# Patient Record
Sex: MALE | Race: WHITE | NOT HISPANIC OR LATINO | URBAN - METROPOLITAN AREA
[De-identification: names, ages, dates, MRNs, and addresses within clinical notes are randomized per-mention and may not be internally consistent; named-entity substitution may affect disease eponyms.]

---

## 2024-01-02 ENCOUNTER — OFFICE VISIT (OUTPATIENT)
Dept: PEDIATRICS | Facility: CLINIC | Age: 19
End: 2024-01-02
Payer: COMMERCIAL

## 2024-01-02 VITALS
SYSTOLIC BLOOD PRESSURE: 116 MMHG | DIASTOLIC BLOOD PRESSURE: 82 MMHG | WEIGHT: 315 LBS | OXYGEN SATURATION: 98 % | HEIGHT: 72 IN | BODY MASS INDEX: 42.66 KG/M2 | TEMPERATURE: 97 F

## 2024-01-02 DIAGNOSIS — J40 BRONCHITIS: ICD-10-CM

## 2024-01-02 DIAGNOSIS — J01.90 ACUTE NON-RECURRENT SINUSITIS, UNSPECIFIED LOCATION: Primary | ICD-10-CM

## 2024-01-02 PROBLEM — E30.0 DELAYED PUBERTY: Status: RESOLVED | Noted: 2024-01-02 | Resolved: 2024-01-02

## 2024-01-02 PROCEDURE — 99214 OFFICE O/P EST MOD 30 MIN: CPT | Performed by: PEDIATRICS

## 2024-01-02 RX ORDER — INHALER, ASSIST DEVICES
SPACER (EA) MISCELLANEOUS
Qty: 1 EACH | Refills: 1 | Status: SHIPPED | OUTPATIENT
Start: 2024-01-02

## 2024-01-02 RX ORDER — AZITHROMYCIN 250 MG/1
TABLET, FILM COATED ORAL
Qty: 6 TABLET | Refills: 0 | Status: SHIPPED | OUTPATIENT
Start: 2024-01-02 | End: 2024-01-06

## 2024-01-02 RX ORDER — ALBUTEROL SULFATE 90 UG/1
2 AEROSOL, METERED RESPIRATORY (INHALATION) EVERY 4 HOURS PRN
Qty: 18 G | Refills: 1 | Status: SHIPPED | OUTPATIENT
Start: 2024-01-02 | End: 2024-02-01

## 2024-01-02 NOTE — PROGRESS NOTES
Subjective   Patient ID: Zeke Wheatley is a 18 y.o. male who presents for Follow-up (Here with mom/Went to urgent care tested negative for strep flu and covid), Cough, Fever, and Nasal Congestion.  HPI  History provided by patient and mom    4-5 days of cough  Seen 4 days ago at urgent care - strep, covid, flu negative    Sore throat, aches/pains, congestion started 2 weeks ago  Seemed like getting better for 1 day, went to work, then worse the next am  Low grade temps  yesterday felt hard to breathe  Last night took ibuprofen for chest wall pain  Not sleeping well    Hx asthma, no need for meds in a few years    Objective   Vitals:    01/02/24 1025 01/02/24 1027   BP: 116/82    Temp: 36.1 °C (97 °F)    SpO2:  98%   Weight: (!) 174 kg (383 lb)    Height: 1.829 m (6')       Physical Exam  Constitutional:       General: He is not in acute distress.  HENT:      Right Ear: Tympanic membrane normal.      Left Ear: Tympanic membrane normal.      Nose: Congestion present.      Mouth/Throat:      Mouth: Mucous membranes are moist.      Pharynx: Oropharynx is clear. No oropharyngeal exudate or posterior oropharyngeal erythema.   Eyes:      Conjunctiva/sclera: Conjunctivae normal.   Cardiovascular:      Rate and Rhythm: Normal rate and regular rhythm.      Heart sounds: Normal heart sounds.   Pulmonary:      Effort: Pulmonary effort is normal.      Breath sounds: Normal breath sounds.   Chest:      Comments: Tenderness over anterior chest wall  Musculoskeletal:      Cervical back: Neck supple.   Lymphadenopathy:      Cervical: No cervical adenopathy.   Skin:     Findings: No rash.   Neurological:      Mental Status: He is alert.       Assessment/Plan   Diagnoses and all orders for this visit:  Acute non-recurrent sinusitis, unspecified location  Bronchitis  -     azithromycin (Zithromax) 250 mg tablet; Take 2 tablets (500 mg) by mouth once daily for 1 day, THEN 1 tablet (250 mg) once daily for 4 days.  -      inhalational spacing device (Space Chamber Plus) inhaler; Use with inhaler.  -     albuterol 90 mcg/actuation inhaler; Inhale 2 puffs every 4 hours if needed for wheezing or shortness of breath (Use with spacer device.).  Alanis has a prolonged upper respiratory illness; will treat for sinus infection due to duration.  -  Complete full course of antibiotics.   -  Use albuterol as needed  -  May use acetaminophen or ibuprofen as needed for pain or fever.   -  Follow up if not improving over the next 5-7 days, sooner if worsening or other concerns.

## 2025-02-28 ENCOUNTER — TELEPHONE (OUTPATIENT)
Dept: PEDIATRICS | Facility: CLINIC | Age: 20
End: 2025-02-28
Payer: COMMERCIAL

## 2025-02-28 NOTE — TELEPHONE ENCOUNTER
Mom called to schedule an apt for Zeke today but was transferred to triage.  Mom said that for about a week Zeke's felt something jabbing him in the back of his throat.  He said it's something  really tiny, but it's sharp and jagged and is causing him immense pain and he's been sticking his finger down his throat to see if there's anything there, but isn't able to bring anything up.  He has a very sensitive gag reflex and he hasn't gargled with anything or done anything other than trying to reach whatever he's feeling.  Mom was wondering if it could be a tonsil stone, but when she looked at his throat she couldn't see anything except that the right side of his throat is red and swollen.  He has no fever and no other sick symptoms.  He is able to swallow, but it hurts to swallow.   Discussed that we can seen Zeke here and transferred the call to the front so mom could schedule an apt.

## 2025-05-28 ENCOUNTER — OFFICE VISIT (OUTPATIENT)
Dept: URGENT CARE | Age: 20
End: 2025-05-28
Payer: COMMERCIAL

## 2025-05-28 VITALS
OXYGEN SATURATION: 98 % | BODY MASS INDEX: 42.66 KG/M2 | WEIGHT: 315 LBS | RESPIRATION RATE: 16 BRPM | TEMPERATURE: 98.4 F | HEART RATE: 91 BPM | HEIGHT: 72 IN | SYSTOLIC BLOOD PRESSURE: 136 MMHG | DIASTOLIC BLOOD PRESSURE: 85 MMHG

## 2025-05-28 DIAGNOSIS — J06.9 VIRAL URI: Primary | ICD-10-CM

## 2025-05-28 DIAGNOSIS — Z20.822 SUSPECTED COVID-19 VIRUS INFECTION: ICD-10-CM

## 2025-05-28 DIAGNOSIS — J30.2 SEASONAL ALLERGIES: ICD-10-CM

## 2025-05-28 DIAGNOSIS — J02.8 SORE THROAT (VIRAL): ICD-10-CM

## 2025-05-28 DIAGNOSIS — B97.89 SORE THROAT (VIRAL): ICD-10-CM

## 2025-05-28 LAB
POC HUMAN RHINOVIRUS PCR: NEGATIVE
POC INFLUENZA A VIRUS PCR: NEGATIVE
POC INFLUENZA B VIRUS PCR: NEGATIVE
POC RAPID MONO: NEGATIVE
POC RESPIRATORY SYNCYTIAL VIRUS PCR: NEGATIVE
POC SARS-COV-2 AG BINAX: NORMAL
POC STREPTOCOCCUS PYOGENES (GROUP A STREP) PCR: NEGATIVE

## 2025-05-28 PROCEDURE — 86308 HETEROPHILE ANTIBODY SCREEN: CPT

## 2025-05-28 PROCEDURE — 3008F BODY MASS INDEX DOCD: CPT

## 2025-05-28 PROCEDURE — 87631 RESP VIRUS 3-5 TARGETS: CPT

## 2025-05-28 PROCEDURE — 99213 OFFICE O/P EST LOW 20 MIN: CPT

## 2025-05-28 PROCEDURE — 1036F TOBACCO NON-USER: CPT

## 2025-05-28 PROCEDURE — 87651 STREP A DNA AMP PROBE: CPT

## 2025-05-28 NOTE — PATIENT INSTRUCTIONS
Follow printed instructions.  Maintain adequate hydration and nutrition.  If symptoms worsen, do not improve, or any other concerning or worrisome symptoms develop please return to the clinic or go to the emergency department.  If symptoms persist beyond 10 days and are not improving or worsening please return or go to ER for reassessment.  Call referral number tomorrow to schedule appointment with PCP to become established.

## 2025-05-28 NOTE — PROGRESS NOTES
Subjective   Patient ID: Zeke Wheatley is a 19 y.o. male. They present today with a chief complaint of Sore Throat, Fever, Cough, and Nasal Congestion (X3 days).    History of Present Illness  19-year-old male presents urgent care with dad for complaint of sore throat, cough, nasal congestion/runny nose with clear nasal drainage and fever that has since resolved for the past 3 days.  Denies any current headaches, vision changes, chest pain short of breath, abdominal pain, ear pain, sinus pressure pain, rash.  Denies any other concerns this time.  Strep spot fire, COVID, mono are all negative.  Educated likely viral URI.  Could also have underlying seasonal allergies.  Educated on supportive care.  PCP referral.  Return/ER precautions.  Patient and patient's dad agree with plan.          Past Medical History  Allergies as of 05/28/2025 - Reviewed 05/28/2025   Allergen Reaction Noted    Amoxicillin Hives 01/02/2024    Augmentin [amoxicillin-pot clavulanate] Hives 01/02/2024       Prescriptions Prior to Admission[1]     Medical History[2]    Surgical History[3]     reports that he has never smoked. He has never used smokeless tobacco. He reports that he does not drink alcohol and does not use drugs.    Review of Systems  Review of Systems   All other systems reviewed and are negative.                                 Objective    Vitals:    05/28/25 1747   BP: 136/85   BP Location: Left arm   Patient Position: Sitting   BP Cuff Size: Adult long   Pulse: 91   Resp: 16   Temp: 36.9 °C (98.4 °F)   TempSrc: Oral   SpO2: 98%   Weight: (!) 172 kg (380 lb)   Height: 1.829 m (6')     No LMP for male patient.    Physical Exam  Vitals reviewed.   Constitutional:       General: He is not in acute distress.     Appearance: Normal appearance. He is not ill-appearing, toxic-appearing or diaphoretic.   HENT:      Head: Normocephalic and atraumatic.      Comments: No sinus tenderness.     Right Ear: Tympanic membrane, ear canal  and external ear normal.      Left Ear: Tympanic membrane, ear canal and external ear normal.      Nose: Congestion and rhinorrhea present.      Mouth/Throat:      Mouth: Mucous membranes are moist.      Pharynx: Oropharynx is clear.      Comments: Pharynx mildly erythematous without exudate, uvula midline and normal, airway clear.  Cardiovascular:      Rate and Rhythm: Normal rate and regular rhythm.   Pulmonary:      Effort: Pulmonary effort is normal. No respiratory distress.      Breath sounds: Normal breath sounds. No stridor. No wheezing, rhonchi or rales.   Abdominal:      General: Abdomen is flat.      Palpations: Abdomen is soft.      Tenderness: There is no abdominal tenderness. There is no right CVA tenderness or left CVA tenderness.   Musculoskeletal:      Cervical back: Normal range of motion and neck supple. Tenderness present. No rigidity.   Lymphadenopathy:      Cervical: Cervical adenopathy present.   Skin:     General: Skin is warm and dry.   Neurological:      General: No focal deficit present.      Mental Status: He is alert and oriented to person, place, and time.   Psychiatric:         Mood and Affect: Mood normal.         Behavior: Behavior normal.         Procedures    Point of Care Test & Imaging Results from this visit  No results found for this visit on 05/28/25.   Imaging  No results found.    Cardiology, Vascular, and Other Imaging  No other imaging results found for the past 2 days      Diagnostic study results (if any) were reviewed by Petty Mantilla PA-C.    Assessment/Plan   Allergies, medications, history, and pertinent labs/EKGs/Imaging reviewed by Petty Mantilla PA-C.     Medical Decision Making  19-year-old male presents urgent care with dad for complaint of sore throat, cough, nasal congestion/runny nose with clear nasal drainage and fever that has since resolved for the past 3 days.  Denies any current headaches, vision changes, chest pain short of breath, abdominal  pain, ear pain, sinus pressure pain, rash.  Denies any other concerns this time.  Strep spot fire, COVID, mono are all negative.  Educated likely viral URI.  Could also have underlying seasonal allergies.  Educated on supportive care.  PCP referral.  Return/ER precautions.  Patient and patient's dad agree with plan.    Orders and Diagnoses  Diagnoses and all orders for this visit:  Sore throat (viral)  -     POCT SPOTFIRE R/ST Panel Mini w/Strep A ([facility]) manually resulted      Medical Admin Record      Patient disposition: Home    Electronically signed by Petty Mantilla PA-C  5:50 PM           [1] (Not in a hospital admission)  [2]   Past Medical History:  Diagnosis Date    Delayed puberty 01/02/2024   [3] No past surgical history on file.

## 2025-05-29 ENCOUNTER — TELEPHONE (OUTPATIENT)
Dept: URGENT CARE | Age: 20
End: 2025-05-29